# Patient Record
Sex: MALE | Race: WHITE
[De-identification: names, ages, dates, MRNs, and addresses within clinical notes are randomized per-mention and may not be internally consistent; named-entity substitution may affect disease eponyms.]

---

## 2018-11-30 ENCOUNTER — HOSPITAL ENCOUNTER (EMERGENCY)
Dept: HOSPITAL 63 - ER | Age: 38
Discharge: HOME | End: 2018-11-30
Payer: SELF-PAY

## 2018-11-30 VITALS — WEIGHT: 200 LBS | HEIGHT: 74 IN | BODY MASS INDEX: 25.67 KG/M2

## 2018-11-30 VITALS — DIASTOLIC BLOOD PRESSURE: 89 MMHG | SYSTOLIC BLOOD PRESSURE: 136 MMHG

## 2018-11-30 DIAGNOSIS — Z90.49: ICD-10-CM

## 2018-11-30 DIAGNOSIS — R42: ICD-10-CM

## 2018-11-30 DIAGNOSIS — R10.84: Primary | ICD-10-CM

## 2018-11-30 DIAGNOSIS — K92.1: ICD-10-CM

## 2018-11-30 LAB
ALBUMIN SERPL-MCNC: 3.8 G/DL (ref 3.4–5)
ALBUMIN/GLOB SERPL: 1 {RATIO} (ref 1–1.7)
ALP SERPL-CCNC: 48 U/L (ref 46–116)
ALT SERPL-CCNC: 43 U/L (ref 16–63)
ANION GAP SERPL CALC-SCNC: 8 MMOL/L (ref 6–14)
APTT PPP: YELLOW S
AST SERPL-CCNC: 24 U/L (ref 15–37)
BACTERIA #/AREA URNS HPF: 0 /HPF
BASOPHILS # BLD AUTO: 0.1 X10^3/UL (ref 0–0.2)
BASOPHILS NFR BLD: 1 % (ref 0–3)
BILIRUB SERPL-MCNC: 0.3 MG/DL (ref 0.2–1)
BILIRUB UR QL STRIP: (no result)
BUN/CREAT SERPL: 15 (ref 6–20)
CA-I SERPL ISE-MCNC: 18 MG/DL (ref 8–26)
CALCIUM SERPL-MCNC: 8.6 MG/DL (ref 8.5–10.1)
CHLORIDE SERPL-SCNC: 101 MMOL/L (ref 98–107)
CO2 SERPL-SCNC: 28 MMOL/L (ref 21–32)
CREAT SERPL-MCNC: 1.2 MG/DL (ref 0.7–1.3)
EOSINOPHIL NFR BLD: 0.3 X10^3/UL (ref 0–0.7)
EOSINOPHIL NFR BLD: 3 % (ref 0–3)
ERYTHROCYTE [DISTWIDTH] IN BLOOD BY AUTOMATED COUNT: 12.4 % (ref 11.5–14.5)
FECAL OB PT: NEGATIVE
FIBRINOGEN PPP-MCNC: CLEAR MG/DL
GFR SERPLBLD BASED ON 1.73 SQ M-ARVRAT: 68.1 ML/MIN
GLOBULIN SER-MCNC: 3.7 G/DL (ref 2.2–3.8)
GLUCOSE SERPL-MCNC: 129 MG/DL (ref 70–99)
GLUCOSE UR STRIP-MCNC: (no result) MG/DL
HCT VFR BLD CALC: 45 % (ref 39–53)
HGB BLD-MCNC: 15.2 G/DL (ref 13–17.5)
LIPASE: 124 U/L (ref 73–393)
LYMPHOCYTES # BLD: 3 X10^3/UL (ref 1–4.8)
LYMPHOCYTES NFR BLD AUTO: 34 % (ref 24–48)
MCH RBC QN AUTO: 29 PG (ref 25–35)
MCHC RBC AUTO-ENTMCNC: 34 G/DL (ref 31–37)
MCV RBC AUTO: 86 FL (ref 79–100)
MONO #: 0.8 X10^3/UL (ref 0–1.1)
MONOCYTES NFR BLD: 9 % (ref 0–9)
NEUT #: 4.5 X10^3UL (ref 1.8–7.7)
NEUTROPHILS NFR BLD AUTO: 52 % (ref 31–73)
NITRITE UR QL STRIP: (no result)
PLATELET # BLD AUTO: 281 X10^3/UL (ref 140–400)
POTASSIUM SERPL-SCNC: 3.4 MMOL/L (ref 3.5–5.1)
PROT SERPL-MCNC: 7.5 G/DL (ref 6.4–8.2)
RBC # BLD AUTO: 5.21 X10^6/UL (ref 4.3–5.7)
RBC #/AREA URNS HPF: (no result) /HPF (ref 0–2)
SODIUM SERPL-SCNC: 137 MMOL/L (ref 136–145)
SP GR UR STRIP: 1.01
SQUAMOUS #/AREA URNS LPF: (no result) /LPF
UROBILINOGEN UR-MCNC: 0.2 MG/DL
WBC # BLD AUTO: 8.7 X10^3/UL (ref 4–11)
WBC #/AREA URNS HPF: 0 /HPF (ref 0–4)

## 2018-11-30 PROCEDURE — 82274 ASSAY TEST FOR BLOOD FECAL: CPT

## 2018-11-30 PROCEDURE — 36415 COLL VENOUS BLD VENIPUNCTURE: CPT

## 2018-11-30 PROCEDURE — 81001 URINALYSIS AUTO W/SCOPE: CPT

## 2018-11-30 PROCEDURE — 85025 COMPLETE CBC W/AUTO DIFF WBC: CPT

## 2018-11-30 PROCEDURE — 99284 EMERGENCY DEPT VISIT MOD MDM: CPT

## 2018-11-30 PROCEDURE — 96360 HYDRATION IV INFUSION INIT: CPT

## 2018-11-30 PROCEDURE — 80053 COMPREHEN METABOLIC PANEL: CPT

## 2018-11-30 PROCEDURE — 83690 ASSAY OF LIPASE: CPT

## 2018-11-30 PROCEDURE — 74177 CT ABD & PELVIS W/CONTRAST: CPT

## 2018-11-30 NOTE — RAD
CT scan of the abdomen and pelvis with contrast 11/30/2018

 

CLINICAL HISTORY: Abdominal pain with bloody stools.

 

TECHNIQUE: After the intravenous administration of 75 cc of Omnipaque 300,

contiguous, 5 mm axial sections were obtained through the abdomen and 

pelvis.

 

FINDINGS: Images through the lung bases demonstrate minimal dependent 

subsegmental atelectasis bilaterally.

 

The liver, spleen, pancreas, and adrenal glands are within normal limits. 

Rounded low-attenuation lesions are seen involving both kidneys. These 

measure 3 mm to 1.6 cm in size. They likely represent cysts.

 

The abdominal aorta tapers normally. Surgical clips are seen within the 

gallbladder fossa consistent with a cholecystectomy. No free fluid or free

air is seen within the abdomen. There is no evidence of bowel obstruction.

The appendix is well-visualized and is within normal limits.

 

Images through the pelvis demonstrate the urinary bladder distended with 

urine. Calcifications are seen within the pelvis consistent with 

phleboliths. No free fluid is seen.

 

Very mild S-shaped curvature of the thoracolumbar spine is seen.

 

IMPRESSION: No acute abnormality is seen.

 

Electronically signed by: Nikhil Landon MD (11/30/2018 1:36 AM) [facility]

## 2018-11-30 NOTE — PHYS DOC
Past History


Past Medical History:  No Pertinent History


Past Surgical History:  Cholecystectomy


Alcohol Use:  Rarely


Drug Use:  None





Adult General


Chief Complaint


Chief Complaint:  BLOODY STOOL





HPI


HPI


Patient is a 38-year-old male who presents to the emergency department for 

evaluation. He states that on Tuesday, he had some blood in his stool, and 

today while shopping at [employer], he felt cramping in the urge to have a bowel 

movement, and he states he had a large loose bowel movement. He states he has 

had a cholecystectomy in the past, has diarrhea, but he has had gradually 

worsening generalized abdominal discomfort, somewhat worse in his right mid 

abdomen, for the past 3 days. He has not had any fevers or chills. He reports 

feeling somewhat lightheaded today. He has not had any nausea or vomiting. 

There are no alleviating or exacerbating factors to his symptoms.





Review of Systems


Review of Systems





Constitutional: Denies fever or chills []


Eyes: Denies change in visual acuity, redness, or eye pain []


HENT: Denies nasal congestion or sore throat []


Respiratory: Denies cough or shortness of breath []


Cardiovascular: The patient denies any shortness of breath, chest pain, 

palpitations, or orthopnea []


GI: No additional information not addressed in HPI []


: Denies dysuria or hematuria []


Musculoskeletal: Denies back pain or joint pain []


Integument: Denies rash or skin lesions []


Neurologic: Denies headache, focal weakness or sensory changes []


Endocrine: Denies polyuria or polydipsia []





All other systems were reviewed and found to be within normal limits, except as 

documented in this note.





Current Medications


Current Medications





Current Medications








 Medications


  (Trade)  Dose


 Ordered  Sig/Eda  Start Time


 Stop Time Status Last Admin


Dose Admin


 


 Dicyclomine HCl


  (Bentyl)  20 mg  1X  ONCE  11/30/18 00:45


 11/30/18 00:46 UNV  





 


 Sodium Chloride  1,000 ml @ 


 1,000 mls/hr  Q1H  11/30/18 00:37


 11/30/18 01:36 UNV  














Physical Exam


Physical Exam


PHYSICAL EXAM:





CONSTITUTIONAL: Well developed, well nourished


HEAD: normocephalic, atraumatic


EENT: PERRL, EOMI. Conjunctivae normal color, sclerae non-icteric; moist mucous 

membranes.


NECK: Supple, non-tender; no meningismus.


LUNGS: Lungs CTA, breathing even and unlabored. Normal air movement.


HEART: Regular rate and rhythm, no murmur


CHEST: No deformity; non-tender


ABDOMEN: The abdomen is soft, there is diffuse tenderness to palpation to the 

entire abdomen, without definite focal tenderness to palpation, rebound, or 

guarding, no masses or bruits.


EXTREM: Normal ROM; no deformity, no calf tenderness. Normal pulses palpable in 

all extremities. There is no pedal edema.


SKIN: No rash; no diaphoresis


NEURO: Alert; normal speech and cognition; CN's grossly intact; strength 

grossly intact without focal deficit.


BACK: No CVA TTP.


RECTAL EXAM: There are no hemorrhoids or anal fissures noted. There is brown 

stool in the rectal vault, Hemoccult specimen obtained.





Current Patient Data


Vital Signs





 Vital Signs








  Date Time  Temp Pulse Resp B/P (MAP) Pulse Ox O2 Delivery O2 Flow Rate FiO2


 


11/30/18 00:29 98.2 84 20  97 Room Air  








Lab Results





Laboratory Tests








Test


 11/30/18


00:38 11/30/18


00:56


 


Stool Occult Blood Negative  


 


White Blood Count  8.7 x10^3/uL 


 


Red Blood Count  5.21 x10^6/uL 


 


Hemoglobin  15.2 g/dL 


 


Hematocrit  45.0 % 


 


Mean Corpuscular Volume  86 fL 


 


Mean Corpuscular Hemoglobin  29 pg 


 


Mean Corpuscular Hemoglobin


Concent 


 34 g/dL 





 


Red Cell Distribution Width  12.4 % 


 


Platelet Count  281 x10^3/uL 


 


Neutrophils (%) (Auto)  52 % 


 


Lymphocytes (%) (Auto)  34 % 


 


Monocytes (%) (Auto)  9 % 


 


Eosinophils (%) (Auto)  3 % 


 


Basophils (%) (Auto)  1 % 


 


Neutrophils # (Auto)  4.5 x10^3uL 


 


Lymphocytes # (Auto)  3.0 x10^3/uL 


 


Monocytes # (Auto)  0.8 x10^3/uL 


 


Eosinophils # (Auto)  0.3 x10^3/uL 


 


Basophils # (Auto)  0.1 x10^3/uL 


 


Sodium Level  137 mmol/L 


 


Potassium Level  3.4 mmol/L 


 


Chloride Level  101 mmol/L 


 


Carbon Dioxide Level  28 mmol/L 


 


Anion Gap  8 


 


Blood Urea Nitrogen  18 mg/dL 


 


Creatinine  1.2 mg/dL 


 


Estimated GFR


(Cockcroft-Gault) 


 68.1 





 


BUN/Creatinine Ratio  15 


 


Glucose Level  129 mg/dL 


 


Calcium Level  8.6 mg/dL 


 


Total Bilirubin  0.3 mg/dL 


 


Aspartate Amino Transf


(AST/SGOT) 


 24 U/L 





 


Alanine Aminotransferase


(ALT/SGPT) 


 43 U/L 





 


Alkaline Phosphatase  48 U/L 


 


Total Protein  7.5 g/dL 


 


Albumin  3.8 g/dL 


 


Albumin/Globulin Ratio  1.0 


 


Lipase  124 U/L 








Current Medications








 Medications


  (Trade)  Dose


 Ordered  Sig/Eda


 Route


 PRN Reason  Start Time


 Stop Time Status Last Admin


Dose Admin


 


 Dicyclomine HCl


  (Bentyl)  20 mg  1X  ONCE


 PO


   11/30/18 01:00


 11/30/18 01:01 DC 11/30/18 01:01





 


 Sodium Chloride  1,000 ml @ 


 1,000 mls/hr  Q1H


 IV


   11/30/18 00:45


 11/30/18 01:44  11/30/18 01:00





 


 Iohexol


  (Omnipaque 300


 Mg/ml)  75 ml  1X  ONCE


 IV


   11/30/18 01:00


 11/30/18 01:01 DC 11/30/18 01:05





 


 Info


  (Do NOT chart on


 this entry -- for


 MONITORING)  1 each  PRN DAILY  PRN


 [facility]


 SEE COMMENTS  11/30/18 01:00


 12/2/18 00:59   














EKG


EKG


[]





Radiology/Procedures


Radiology/Procedures


[PROCEDURE: CT ABD PELV W/ IV CONTRST ONLY





CT scan of the abdomen and pelvis with contrast 11/30/2018


 


CLINICAL HISTORY: Abdominal pain with bloody stools.


 


TECHNIQUE: After the intravenous administration of 75 cc of Omnipaque 300,


contiguous, 5 mm axial sections were obtained through the abdomen and 


pelvis.


 


FINDINGS: Images through the lung bases demonstrate minimal dependent 


subsegmental atelectasis bilaterally.


 


The liver, spleen, pancreas, and adrenal glands are within normal limits. 


Rounded low-attenuation lesions are seen involving both kidneys. These 


measure 3 mm to 1.6 cm in size. They likely represent cysts.


 


The abdominal aorta tapers normally. Surgical clips are seen within the 


gallbladder fossa consistent with a cholecystectomy. No free fluid or free


air is seen within the abdomen. There is no evidence of bowel obstruction.


The appendix is well-visualized and is within normal limits.


 


Images through the pelvis demonstrate the urinary bladder distended with 


urine. Calcifications are seen within the pelvis consistent with 


phleboliths. No free fluid is seen.


 


Very mild S-shaped curvature of the thoracolumbar spine is seen.


 


IMPRESSION: No acute abnormality is seen.]





Course & Med Decision Making


Course & Med Decision Making


Pertinent Labs and Imaging studies reviewed. (See chart for details)





[1:45 AM:Patient remains stable. I discussed test results, the need for close 

GI follow-up, and return precautions. The patient will be released after his 

urinalysis result is available.]





Dragon Disclaimer


Dragon Disclaimer


This electronic medical record was generated, in whole or in part, using a 

voice recognition dictation system.





Departure


Departure:


Impression:  


 Primary Impression:  


 Abdominal pain


Disposition:  01 HOME, SELF-CARE


Condition:  STABLE


Patient Instructions:  Abdominal Pain, Rectal Bleeding





Additional Instructions:  


Follow up with Dr Nic Kim, Gastroenterology, at  [phone]  . Please 

call to schedule an appointment











NÉSTOR CEJA MD Nov 30, 2018 00:44